# Patient Record
Sex: MALE | Race: WHITE | URBAN - METROPOLITAN AREA
[De-identification: names, ages, dates, MRNs, and addresses within clinical notes are randomized per-mention and may not be internally consistent; named-entity substitution may affect disease eponyms.]

---

## 2017-01-23 ENCOUNTER — OFFICE VISIT (OUTPATIENT)
Dept: OTHER | Facility: OUTPATIENT CENTER | Age: 32
End: 2017-01-23

## 2017-01-23 DIAGNOSIS — F91.8 CONDUCT DISORDER, UNDIFFERENTIATED TYPE: Primary | ICD-10-CM

## 2017-01-23 DIAGNOSIS — F43.23 ADJUSTMENT DISORDER WITH MIXED ANXIETY AND DEPRESSED MOOD: ICD-10-CM

## 2017-01-23 NOTE — MR AVS SNAPSHOT
After Visit Summary   1/23/2017    Issac Henning    MRN: [id number]           Patient Information     Date Of Birth          1985        Visit Information        Provider Department      1/23/2017 2:00 PM Janette Watson, PhD [facility]        Today's Diagnoses     Conduct disorder, undifferentiated type    -  1    Adjustment disorder with mixed anxiety and depressed mood           Follow-ups after your visit        Your next 10 appointments already scheduled     Mar 01, 2017 11:00 AM CST   INDIVIDUAL THERAPY with Janette Watson, PhD   [facility] ([facility])    [address]  Mail [address] 7521  [facility] [address]   [phone]            Mar 23, 2017  9:00 AM CDT   INDIVIDUAL THERAPY with Janette Watson, PhD   [place] ([facility])    [address]  [address]  [facility] [address]   [phone]              Who to contact     Please call your clinic at [phone] to:    Ask questions about your health    Make or cancel appointments    Discuss your medicines    Learn about your test results    Speak to your doctor   If you have compliments or concerns about an experience at your clinic, or if you wish to file a complaint, please contact [facility] Patient Relations at [phone] or email us at [email or web address]         Additional Information About Your Visit        MyChart Information     [employer] is an electronic gateway that provides easy, online access to your medical records. With [employer], you can request a clinic appointment, read your test results, renew a prescription or communicate with your care team.     To sign up for Cruise Comparet visit the website at [email or web address]   You will be asked to enter the access code listed below, as well as some personal information. Please follow the directions to create your username and password.     Your  access code is: [email or web address]  Expires: 2017  2:53 PM     Your access code will  in 90 days. If you need help or a new code, please contact your [facility] or call [phone] for assistance.        Care EveryWhere ID     This is your Care EveryWhere ID. This could be used by other organizations to access your [place] medical records  [id number]         Blood Pressure from Last 3 Encounters:   No data found for BP    Weight from Last 3 Encounters:   No data found for Wt              We Performed the Following     Individual Psychotherapy (53+ min) [85670]        Primary Care Provider    None Specified       No primary provider on file.        Thank you!     Thank you for choosing [facility]  for your care. Our goal is always to provide you with excellent care. Hearing back from our patients is one way we can continue to improve our services. Please take a few minutes to complete the written survey that you may receive in the mail after your visit with us. Thank you!             Your Updated Medication List - Protect others around you: Learn how to safely use, store and throw away your medicines at www.disposemymeds.org.      Notice  As of 2017 11:59 PM    You have not been prescribed any medications.

## 2017-02-02 NOTE — PROGRESS NOTES
"[facility] -  Case Progress Note    Date of Service: 1/23/17  Client Name: Issac Henning  YOB: 1985  MRN:  [id number]  Treating Provider: Janette Watson (Nic\), Ph.D., Postdoctoral Fellow  Type of Session: Individual  Present in Session: Client Only  Number of Minutes: 55  Treatment plan completed: 12/14/2016    Current Symptoms/Status:  Client reported presenting for treatment with concerns regarding compulsive sexual behavior, specifically regarding erotic photographing and using the internet to gain attention and solicit models for photos shoots. Client reported that his wife became aware of his behaviors during their wedding honeymoon, which has led to distress in their relationship.     Client endorsed some depressive and anxious symptoms within the last six months and particularly since his wife caught him. As such, a diagnosis of Adjustment Disorder, With mixed anxiety and depressed mood is warranted at this time.     Progress Toward Treatment Goals:  discussed triggers and associations between thoughts, feelings, and sexually acting out behaviors    Intervention: Modality and Description/Response to Intervention:  Used CBT and interpersonal techniques to assist client in building rapport with this therapist and processing thoughts and feelings associated with compulsive sexual behaviors as well as anxious and depressive symptoms. Continued discussing client s sexually acting out behaviors. Discussed client s life story including what client believed are important antecedents throughout his life leading to his sexual acting out behaviors. Client reported that he felt as if he often struggled with understanding boundaries as his father was  very lenient  and his mother was  very strict.  He provided several examples of how navigating these boundaries were difficult for him. He also reported that he often did not have many friends, which contributed to him seeking attention from others " "due to feeling lonely and isolated. Reviewed the core fuels client began discussing last week including   feeling worthless, feeling undesirable, fear of failure, and fear of being alone. Discussed client making an outline of his life story and how the important life events are related to his core fuels. Client agreed and stated that he thought this was a good exercise.     Assignment:  continue working on antecedents to behavior pattern    Interactive Complexity:  None    Diagnosis:  312.89 (F91.8)  Other Specified Disruptive, Impulse-Control, and Conduct Disorder (Hypersexuality)  309.28 (F43.23) Adjustment Disorder, With mixed anxiety and depressed mood    Plan / Need for Future Services:  Return for therapy twice per month to address concerns related to compulsive sexual behaviors as well as anxious and depressive symptoms.       Janette \"Michael\" Walter, Ph.D.  Postdoctoral Fellow  "

## 2017-02-06 ENCOUNTER — OFFICE VISIT (OUTPATIENT)
Dept: OTHER | Facility: OUTPATIENT CENTER | Age: 32
End: 2017-02-06

## 2017-02-06 DIAGNOSIS — F43.23 ADJUSTMENT DISORDER WITH MIXED ANXIETY AND DEPRESSED MOOD: ICD-10-CM

## 2017-02-06 DIAGNOSIS — F91.8 CONDUCT DISORDER, UNDIFFERENTIATED TYPE: Primary | ICD-10-CM

## 2017-02-06 NOTE — MR AVS SNAPSHOT
After Visit Summary   2/6/2017    Issac Henning    MRN: [id number]           Patient Information     Date Of Birth          1985        Visit Information        Provider Department      2/6/2017 9:00 AM Janette Watson, PhD [facility]        Today's Diagnoses     Conduct disorder, undifferentiated type    -  1    Adjustment disorder with mixed anxiety and depressed mood           Follow-ups after your visit        Your next 10 appointments already scheduled     Mar 01, 2017 11:00 AM CST   INDIVIDUAL THERAPY with Janette Watson, PhD   [facility] ([facility])    [address]  [address]  [facility] [address]   [phone]            Mar 23, 2017  9:00 AM CDT   INDIVIDUAL THERAPY with Janette Watson PhD   [facility] ([facility])    [address]  [address]  [facility] [address]   [phone]              Who to contact     Please call your clinic at [phone] to:    Ask questions about your health    Make or cancel appointments    Discuss your medicines    Learn about your test results    Speak to your doctor   If you have compliments or concerns about an experience at your clinic, or if you wish to file a complaint, please contact [facility] Patient Relations at [phone] or email us at [email or web address]         Additional Information About Your Visit        MyChart Information     Edustation.me is an electronic gateway that provides easy, online access to your medical records. With Ed[employer].me, you can request a clinic appointment, read your test results, renew a prescription or communicate with your care team.     To sign up for Artwardlyt visit the website at [email or web address][email or web address]   You will be asked to enter the access code listed below, as well as some personal information. Please follow the directions to create your username and password.     Your  access code is: [email or web address]  Expires: 2017  2:53 PM     Your access code will  in 90 days. If you need help or a new code, please contact your [facility] or call [phone] for assistance.        Care EveryWhere ID     This is your Care EveryWhere ID. This could be used by other organizations to access your [place] medical records  [id number]         Blood Pressure from Last 3 Encounters:   No data found for BP    Weight from Last 3 Encounters:   No data found for Wt              We Performed the Following     Individual Psychotherapy (38-52 min) [26880]        Primary Care Provider    None Specified       No primary provider on file.        Thank you!     Thank you for choosing [facility]  for your care. Our goal is always to provide you with excellent care. Hearing back from our patients is one way we can continue to improve our services. Please take a few minutes to complete the written survey that you may receive in the mail after your visit with us. Thank you!             Your Updated Medication List - Protect others around you: Learn how to safely use, store and throw away your medicines at www.disposemymeds.org.      Notice  As of 2017 11:59 PM    You have not been prescribed any medications.

## 2017-02-06 NOTE — PROGRESS NOTES
"[facility] -  Case Progress Note    Date of Service: 2/06/17  Client Name: Issac Henning  YOB: 1985  MRN:  [id number]  Treating Provider: Janette \"Michael\" Walter, Ph.D., Postdoctoral Fellow  Type of Session: Individual  Present in Session: Client Only  Number of Minutes: 43  Treatment plan completed: 12/14/2016    Current Symptoms/Status:  Client reported presenting for treatment with concerns regarding compulsive sexual behavior, specifically regarding erotic photographing and using the internet to gain attention and solicit models for photos shoots. Client reported that his wife became aware of his behaviors during their wedding honeymoon, which has led to distress in their relationship.     Client endorsed some depressive and anxious symptoms within the last six months and particularly since his wife caught him. As such, a diagnosis of Adjustment Disorder, With mixed anxiety and depressed mood is warranted at this time.     Progress Toward Treatment Goals:  Continuing to explore triggers and associations between thoughts, feelings, and sexually acting out behaviors    Intervention: Modality and Description/Response to Intervention:  Used CBT, interpersonal, and supportive psychotherapy techniques to assist client with processing thoughts and feelings associated with compulsive sexual behaviors as well as anxious and depressive symptoms. Client arrived to the session late and stated that he misplaced his homework. Discussed client's desire to readdress his wife's concerns that he cheated. Client reported that he is avoiding the conversation and is waiting for his wife to bring it up to him again. Explored how avoidance, minimization, and justifying his behaviors have become themes and contribute to his pattern of behaviors including sexually acting out behaviors. Continued discussing life story. Client described that he felt like an \"outcast\" for much of his childhood. Reported that he would " "have friends for short periods of time. Explained that he would \"cut off\" friendships when his peers asked to come to his house due to \"embarrassment\" that his \"mom was a hoarder.\" Reported that he learned to lie to cover up his shame and embarrassment, which he turned into a coping skill for other areas of his life. Discussion topic shifted at this point and client expressed frustration that he feels his wife is \"controlling\" and \"babying\" him. Explored what client means by these descriptors and also explored alternative conceptualizations of what is occurring in these interactions. Discussed client being \"used to looking out\" for himself and how that frames his interactions with his wife. Towards the end of the session, client stated that he would continue working on his life story and core fuels as homework for the next session.     Assignment:  Continue working on life story and core fuels    Interactive Complexity:  None    Diagnosis:  312.89 (F91.8)  Other Specified Disruptive, Impulse-Control, and Conduct Disorder (Hypersexuality)  309.28 (F43.23) Adjustment Disorder, With mixed anxiety and depressed mood    Plan / Need for Future Services:  Return for therapy twice per month to address concerns related to compulsive sexual behaviors as well as anxious and depressive symptoms.       Janette \"Michael\" Walter, Ph.D.  Postdoctoral Fellow  "

## 2017-02-15 NOTE — PROGRESS NOTES
I did not personally see the patient but I have reviewed and agree with the assessment and plan as documented in this note.  Kamini Dean, PhD -- Supervisor   Licensed Psychologist

## 2017-03-01 ENCOUNTER — OFFICE VISIT (OUTPATIENT)
Dept: OTHER | Facility: OUTPATIENT CENTER | Age: 32
End: 2017-03-01

## 2017-03-01 DIAGNOSIS — F43.23 ADJUSTMENT DISORDER WITH MIXED ANXIETY AND DEPRESSED MOOD: ICD-10-CM

## 2017-03-01 DIAGNOSIS — F91.8 CONDUCT DISORDER, UNDIFFERENTIATED TYPE: Primary | ICD-10-CM

## 2017-03-01 NOTE — MR AVS SNAPSHOT
After Visit Summary   3/1/2017    Issac Henning    MRN: [id number]           Patient Information     Date Of Birth          1987        Visit Information        Provider Department      3/1/2017 10:00 AM Janette Watson, PhD [facility]        Today's Diagnoses     Conduct disorder, undifferentiated type    -  1    Adjustment disorder with mixed anxiety and depressed mood           Follow-ups after your visit        Your next 10 appointments already scheduled     Mar 23, 2017  9:00 AM CDT   INDIVIDUAL THERAPY with Janette Watson, PhD   [facility]    [address]  [address]  [facility] [address]   [phone]              Who to contact     Please call your clinic at [phone] to:    Ask questions about your health    Make or cancel appointments    Discuss your medicines    Learn about your test results    Speak to your doctor   If you have compliments or concerns about an experience at your clinic, or if you wish to file a complaint, please contact [facility] Patient Relations at [phone] or email us at [email or web address]         Additional Information About Your Visit        MyChart Information     [employer] is an electronic gateway that provides easy, online access to your medical records. With [employer], you can request a clinic appointment, read your test results, renew a prescription or communicate with your care team.     To sign up for Mi[employer]t visit the website at [email or web address]   You will be asked to enter the access code listed below, as well as some personal information. Please follow the directions to create your username and password.     Your access code is: [email or web address]  Expires: 2017  2:53 PM     Your access code will  in 90 days. If you need help or a new code, please contact your [facility] or call [phone]  for assistance.        Care EveryWhere ID     This is your Care EveryWhere ID. This could be used by other organizations to access your [place] medical records  [id number]         Blood Pressure from Last 3 Encounters:   No data found for BP    Weight from Last 3 Encounters:   No data found for Wt              We Performed the Following     Individual Psychotherapy (53+ min) [21482]        Primary Care Provider    None Specified       No primary provider on file.        Thank you!     Thank you for choosing [facility]  for your care. Our goal is always to provide you with excellent care. Hearing back from our patients is one way we can continue to improve our services. Please take a few minutes to complete the written survey that you may receive in the mail after your visit with us. Thank you!             Your Updated Medication List - Protect others around you: Learn how to safely use, store and throw away your medicines at www.disposemymeds.org.      Notice  As of 3/1/2017 11:59 PM    You have not been prescribed any medications.

## 2017-03-01 NOTE — PROGRESS NOTES
"[facility] -  Case Progress Note    Date of Service: 3/01/17  Client Name: Issac Henning  YOB: 1985  MRN:  [id number]  Treating Provider: Janette Watson (Nic\), Ph.D., Postdoctoral Fellow  Type of Session: Individual  Present in Session: Client Only  Number of Minutes: 53  Treatment plan completed: 12/14/2016    Current Symptoms/Status:  Client reported presenting for treatment with concerns regarding compulsive sexual behavior, specifically regarding erotic photographing and using the internet to gain attention and solicit models for photos shoots. Client reported that his wife became aware of his behaviors during their wedding honeymoon, which has led to distress in their relationship.     Client endorsed some depressive and anxious symptoms within the last six months and particularly since his wife caught him. As such, a diagnosis of Adjustment Disorder, With mixed anxiety and depressed mood is warranted at this time.     Progress Toward Treatment Goals:  Continuing to explore triggers and associations between thoughts, feelings, and sexually acting out behaviors; began discussing thought logs    Intervention: Modality and Description/Response to Intervention:  Used CBT, interpersonal, and supportive psychotherapy techniques to assist client with processing thoughts and feelings associated with compulsive sexual behaviors as well as anxious and depressive symptoms. Client began the session reporting that he had completed his treatment work. Discussed client's homework, specifically client's life story and how particular life events relate to his core fuels. Praised client for completing his work and explaining it to this therapist. Explored how client's core fuels get triggered currently. Discussed examples from work, his marriage, and incidents with his parents. Provided psychoeducation about the reciprocal interaction of thoughts, feelings, and behaviors as well as thought logs. Used " "examples he provided to complete practice thought logs in session. Client reported being \"shocked\" to see similar words he used for core fuels come up with daily life examples. Discussed examples related to his compulsive sexual behaviors. Discussed client's core fuels being triggered results in \"feel good\" activities such as when he has looked up models on the internet and scheduled models for photoshoots. Discussed the importance of disputing his thoughts/creating alternative ways of thinking when he encounters triggering situations that result in urges to engage in problematic behaviors. Discussed how thoughts logs can assist with this. Client was engaged throughout the session and open to feedback.    Assignment:  Complete thought logs (3 per week)    Interactive Complexity:  None    Diagnosis:  312.89 (F91.8)  Other Specified Disruptive, Impulse-Control, and Conduct Disorder (Hypersexuality)  309.28 (F43.23) Adjustment Disorder, With mixed anxiety and depressed mood    Plan / Need for Future Services:  Return for therapy twice per month to address concerns related to compulsive sexual behaviors as well as anxious and depressive symptoms.       Janette \"Michael\" Walter, Ph.D.  Postdoctoral Fellow  "

## 2017-03-06 NOTE — PROGRESS NOTES
I did not personally see the patient.  I reviewed and agree with the assessment and plan as documented in this note. Johanne Ansari, PhD, LP

## 2017-03-23 ENCOUNTER — OFFICE VISIT (OUTPATIENT)
Dept: OTHER | Facility: OUTPATIENT CENTER | Age: 32
End: 2017-03-23

## 2017-03-23 DIAGNOSIS — F91.8 CONDUCT DISORDER, UNDIFFERENTIATED TYPE: Primary | ICD-10-CM

## 2017-03-23 DIAGNOSIS — F43.23 ADJUSTMENT DISORDER WITH MIXED ANXIETY AND DEPRESSED MOOD: ICD-10-CM

## 2017-03-23 NOTE — MR AVS SNAPSHOT
After Visit Summary   3/23/2017    Issac Henning    MRN: [id number]           Patient Information     Date Of Birth          1985        Visit Information        Provider Department      3/23/2017 9:00 AM Janette Watson, PhD [facility]        Today's Diagnoses     Conduct disorder, undifferentiated type    -  1    Adjustment disorder with mixed anxiety and depressed mood           Follow-ups after your visit        Your next 10 appointments already scheduled     Apr 26, 2017  4:00 PM CDT   INDIVIDUAL THERAPY with Janette Watson PhD   [facility] ([facility])    [address]  [address]  [facility] [address]   [phone]            May 24, 2017  9:00 AM CDT   INDIVIDUAL THERAPY with Janette Watson PhD   [facility] ([facility])    [address]  [address]  [facility] [address]   [phone]            Jun 14, 2017 11:00 AM CDT   INDIVIDUAL THERAPY with Janette Watson PhD   [facility] ([facility])    [address]  [address]  [facility] [address]   [phone]              Who to contact     Please call your clinic at [phone] to:    Ask questions about your health    Make or cancel appointments    Discuss your medicines    Learn about your test results    Speak to your doctor   If you have compliments or concerns about an experience at your clinic, or if you wish to file a complaint, please contact [facility] Patient Relations at [phone] or email us at [email or web address]         Additional Information About Your Visit        MyChart Information     Mirriad is an electronic gateway that provides easy, online access to your medical records. With Mirriad, you can request a clinic appointment, read your test results, renew a prescription or communicate with your care team.     To sign up for MyChart visit  the website at [email or web address]   You will be asked to enter the access code listed below, as well as some personal information. Please follow the directions to create your username and password.     Your access code is: [email or web address]  Expires: 2017  3:53 PM     Your access code will  in 90 days. If you need help or a new code, please contact your [facility] or call [phone] for assistance.        Care EveryWhere ID     This is your Care EveryWhere ID. This could be used by other organizations to access your [place] medical records  [id number]         Blood Pressure from Last 3 Encounters:   No data found for BP    Weight from Last 3 Encounters:   No data found for Wt              We Performed the Following     Individual Psychotherapy (38-52 min) [62910]        Primary Care Provider    None Specified       No primary provider on file.        Thank you!     Thank you for choosing [facility]  for your care. Our goal is always to provide you with excellent care. Hearing back from our patients is one way we can continue to improve our services. Please take a few minutes to complete the written survey that you may receive in the mail after your visit with us. Thank you!             Your Updated Medication List - Protect others around you: Learn how to safely use, store and throw away your medicines at www.disposemymeds.org.      Notice  As of 3/23/2017 11:59 PM    You have not been prescribed any medications.

## 2017-04-10 NOTE — PROGRESS NOTES
"[facility] -  Case Progress Note    Date of Service: 3/23/17  Client Name: Issac Henning  YOB: 1985  MRN:  [id number]  Treating Provider: Janette Watson (Nic\), Ph.D., Postdoctoral Fellow  Type of Session: Individual  Present in Session: Client Only  Number of Minutes: 45  Treatment plan completed: 12/14/2016    Current Symptoms/Status:  Client reported presenting for treatment with concerns regarding compulsive sexual behavior, specifically regarding erotic photographing and using the internet to gain attention and solicit models for photos shoots. Client reported that his wife became aware of his behaviors during their wedding honeymoon, which has led to distress in their relationship.     Client endorsed some depressive and anxious symptoms within the last six months and particularly since his wife caught him. As such, a diagnosis of Adjustment Disorder, With mixed anxiety and depressed mood is warranted at this time.     Progress Toward Treatment Goals:  Arrived 15 minutes late to session; continuing to explore triggers and associations between thoughts, feelings, and sexually acting out behaviors; began discussing thought logs    Intervention: Modality and Description/Response to Intervention:  Cognitive behavioral and supportive psychotherapy techniques were used to assist client with processing his urges and boundaries. Client reported having a recent boundary violation and how this resulted in an argument with his wife. Client shared his self-narrative when he had the urge to violate his boundary and his self-narrative afterwards. Processed his thinking about boundaries and his difficulty maintaining them. Client shared that he feels  controlled  and explored his thoughts and core beliefs related to this. Discussed the importance of understanding his wife s feelings in this situation and how his behaviors impact both him and her. Towards end of session, discussed and agreed to " "include client s wife in an upcoming session. Client was engaged throughout the session and open to feedback.    Assignment:  Complete thought logs    Interactive Complexity:  None    Diagnosis:  312.89 (F91.8)  Other Specified Disruptive, Impulse-Control, and Conduct Disorder (Hypersexuality)  309.28 (F43.23) Adjustment Disorder, With mixed anxiety and depressed mood    Plan / Need for Future Services:  Return for therapy twice per month to address concerns related to compulsive sexual behaviors as well as anxious and depressive symptoms.       Janette \"Michael\" Walter, Ph.D.  Postdoctoral Fellow  "

## 2017-04-17 NOTE — PROGRESS NOTES
I did not personally see the patient.  I reviewed and agree with the assessment and plan as documented in this note. Johanne Asnari, PhD, LP

## 2017-04-26 ENCOUNTER — OFFICE VISIT (OUTPATIENT)
Dept: OTHER | Facility: OUTPATIENT CENTER | Age: 32
End: 2017-04-26

## 2017-04-26 DIAGNOSIS — F91.8 CONDUCT DISORDER, UNDIFFERENTIATED TYPE: Primary | ICD-10-CM

## 2017-04-26 DIAGNOSIS — F43.23 ADJUSTMENT DISORDER WITH MIXED ANXIETY AND DEPRESSED MOOD: ICD-10-CM

## 2017-04-26 NOTE — MR AVS SNAPSHOT
After Visit Summary   4/26/2017    Issac Henning    MRN: [id number]           Patient Information     Date Of Birth          1985        Visit Information        Provider Department      4/26/2017 4:00 PM Janette Watson, PhD [facility]        Today's Diagnoses     Conduct disorder, undifferentiated type    -  1    Adjustment disorder with mixed anxiety and depressed mood           Follow-ups after your visit        Your next 10 appointments already scheduled     May 24, 2017  9:00 AM CDT   INDIVIDUAL THERAPY with Janette Watson, PhD   [facility] ([facility])    [address]  [address]  [facility] [address]   [phone]            Jun 14, 2017 11:00 AM CDT   INDIVIDUAL THERAPY with Janette Watson, PhD   [facility] ([facility])    [address]  [address]  [facility] [address]   [phone]              Who to contact     Please call your clinic at [phone] to:    Ask questions about your health    Make or cancel appointments    Discuss your medicines    Learn about your test results    Speak to your doctor   If you have compliments or concerns about an experience at your clinic, or if you wish to file a complaint, please contact [facility] Patient Relations at [phone] or email us at [email or web address]         Additional Information About Your Visit        MyChart Information     Alkymos is an electronic gateway that provides easy, online access to your medical records. With Alkymos, you can request a clinic appointment, read your test results, renew a prescription or communicate with your care team.     To sign up for [employer] visit the website at [email or web address]   You will be asked to enter the access code listed below, as well as some personal information. Please follow the directions to create your username and password.     Your  access code is: [email or web address]  Expires: 2017  9:08 PM     Your access code will  in 90 days. If you need help or a new code, please contact your [facility] or call [phone] for assistance.        Care EveryWhere ID     This is your Care EveryWhere ID. This could be used by other organizations to access your [place] medical records  [id number]         Blood Pressure from Last 3 Encounters:   No data found for BP    Weight from Last 3 Encounters:   No data found for Wt              We Performed the Following     Individual Psychotherapy (53+ min) [47668]        Primary Care Provider    None Specified       No primary provider on file.        Thank you!     Thank you for choosing [facility]  for your care. Our goal is always to provide you with excellent care. Hearing back from our patients is one way we can continue to improve our services. Please take a few minutes to complete the written survey that you may receive in the mail after your visit with us. Thank you!             Your Updated Medication List - Protect others around you: Learn how to safely use, store and throw away your medicines at www.disposemymeds.org.      Notice  As of 2017 11:59 PM    You have not been prescribed any medications.

## 2017-05-01 NOTE — PROGRESS NOTES
"[facility] -  Case Progress Note    Date of Service: 4/26/17  Client Name: Issac Henning  YOB: 1985  MRN:  [id number]  Treating Provider: Janette Watson (Nic\), Ph.D., Postdoctoral Fellow  Type of Session: Individual  Present in Session: Client Only  Number of Minutes: 55  Treatment plan completed: 12/14/2016    Current Symptoms/Status:  Client reported presenting for treatment with concerns regarding compulsive sexual behavior, specifically regarding erotic photographing and using the internet to gain attention and solicit models for photos shoots. Client reported that his wife became aware of his behaviors during their wedding honeymoon, which has led to distress in their relationship.     Client endorsed some depressive and anxious symptoms within the last six months and particularly since his wife caught him. As such, a diagnosis of Adjustment Disorder, With mixed anxiety and depressed mood is warranted at this time.     Progress Toward Treatment Goals:  Continuing to explore triggers and associations between thoughts, feelings, and sexually acting out behaviors; continuing to complete CBT thought logs    Intervention: Modality and Description/Response to Intervention:  Cognitive behavioral and supportive psychotherapy techniques were used to assist client with processing his urges and ongoing distress. Processed recent stress, anxiety, and frustrations in relationship. Use CBT form to process recent incident where he got in an argument with his wife and engaged in a negative self-narrative. He shared details of the interaction and what he did. Discussed patterns that seemed to be present. Explored connections of patterns to childhood experiences, attention seeking, and acting out behaviors. Processed ways to communicate some of his patterns of his partner and steps to change some patterns. Towards the end of the session, made plans for client's wife to come to next session to meet " "therapist and address some questions she has.     Assignment:  Complete thought logs    Interactive Complexity:  None    Diagnosis:  312.89 (F91.8)  Other Specified Disruptive, Impulse-Control, and Conduct Disorder (Hypersexuality)  309.28 (F43.23) Adjustment Disorder, With mixed anxiety and depressed mood    Plan / Need for Future Services:  Return for therapy twice per month to address concerns related to compulsive sexual behaviors as well as anxious and depressive symptoms.       Janette \"Michael\" Walter, Ph.D.  Postdoctoral Fellow  "

## 2017-05-24 ENCOUNTER — OFFICE VISIT (OUTPATIENT)
Dept: OTHER | Facility: OUTPATIENT CENTER | Age: 32
End: 2017-05-24

## 2017-05-24 DIAGNOSIS — F43.23 ADJUSTMENT DISORDER WITH MIXED ANXIETY AND DEPRESSED MOOD: ICD-10-CM

## 2017-05-24 DIAGNOSIS — F91.8 CONDUCT DISORDER, UNDIFFERENTIATED TYPE: Primary | ICD-10-CM

## 2017-05-24 NOTE — MR AVS SNAPSHOT
After Visit Summary   5/24/2017    Issac Henning    MRN: [id number]           Patient Information     Date Of Birth          1985        Visit Information        Provider Department      5/24/2017 9:00 AM Janette Watson, PhD [facility]        Today's Diagnoses     Conduct disorder, undifferentiated type    -  1    Adjustment disorder with mixed anxiety and depressed mood           Follow-ups after your visit        Your next 10 appointments already scheduled     Jun 14, 2017 11:00 AM CDT   INDIVIDUAL THERAPY with Janette Watson PhD   [facility] ([facility])    [address]  [address]  [facility] [address]   [phone]            Jun 28, 2017  3:00 PM CDT   INDIVIDUAL THERAPY with Janette Watson PhD   [facility] ([facility])    [address]  [address]  [facility] [address]   [phone]            Jul 12, 2017 10:00 AM CDT   INDIVIDUAL THERAPY with Janette Watson PhD   [facility] ([facility])    [address]  [address]  [facility] [address]   [phone]            Jul 31, 2017  9:00 AM CDT   INDIVIDUAL THERAPY with Janette Watson PhD   [facility] ([facility])    [address]  [address]  [facility] [address]   [phone]              Who to contact     Please call your clinic at [phone] to:    Ask questions about your health    Make or cancel appointments    Discuss your medicines    Learn about your test results    Speak to your doctor   If you have compliments or concerns about an experience at your clinic, or if you wish to file a complaint, please contact [facility] Patient Relations at [phone] or email us at [email or web address]         Additional Information About Your Visit        MyChart Information     WANdisco is an electronic gateway that  provides easy, online access to your medical records. With Introhive, you can request a clinic appointment, read your test results, renew a prescription or communicate with your care team.     To sign up for Introhive visit the website at [email or web address]   You will be asked to enter the access code listed below, as well as some personal information. Please follow the directions to create your username and password.     Your access code is: [email or web address]  Expires: 2017  9:08 PM     Your access code will  in 90 days. If you need help or a new code, please contact your [facility] or call [phone] for assistance.        Care EveryWhere ID     This is your Care EveryWhere ID. This could be used by other organizations to access your [place] medical records  [id number]         Blood Pressure from Last 3 Encounters:   No data found for BP    Weight from Last 3 Encounters:   No data found for Wt              We Performed the Following     Individual Psychotherapy (53+ min) [70550]        Primary Care Provider    None Specified       No primary provider on file.        Thank you!     Thank you for choosing [facility]  for your care. Our goal is always to provide you with excellent care. Hearing back from our patients is one way we can continue to improve our services. Please take a few minutes to complete the written survey that you may receive in the mail after your visit with us. Thank you!             Your Updated Medication List - Protect others around you: Learn how to safely use, store and throw away your medicines at www.disposemymeds.org.      Notice  As of 2017 11:59 PM    You have not been prescribed any medications.

## 2017-05-24 NOTE — PROGRESS NOTES
"[facility] -  Case Progress Note    Date of Service: 5/24/17  Client Name: Issac Henning  YOB: 1985  MRN:  [id number]  Treating Provider: Janette Downs\" Walter, Ph.D., Postdoctoral Fellow  Type of Session: Individual  Present in Session: Client Only  Number of Minutes: 55  Treatment plan completed: 12/14/2016    Current Symptoms/Status:  Client reported presenting for treatment with concerns regarding compulsive sexual behavior, specifically regarding erotic photographing and using the internet to gain attention and solicit models for photos shoots. Client reported that his wife became aware of his behaviors during their wedding honeymoon, which has led to distress in their relationship.     Client endorsed some depressive and anxious symptoms within the last six months and particularly since his wife caught him. As such, a diagnosis of Adjustment Disorder, With mixed anxiety and depressed mood is warranted at this time.     Progress Toward Treatment Goals:  Continuing to explore triggers and associations between thoughts, feelings, and sexually acting out behaviors; continuing to complete CBT thought logs    Intervention: Modality and Description/Response to Intervention:  Cognitive behavioral and supportive psychotherapy techniques were used to assist client with processing his urges and ongoing distress. Began the session discussing frequency of client's appointments and arranging appointments so client is coming in twice per month. Client reported that his wife was not able to attend the session because she had to go out of town for work. Client noted fluctuations in mood. Noted financial stress due to receiving bills for sessions and not being sure why insurance has not been paying for sessions. Encouraged client to call insurance company to discuss concerns and determine if it is possible that he is paying his deductible. Client agreed to do so. Then, processed client's recent anxiety " "and frustrations in relationship. Client shared that he has urges to violate boundaries while online. Used CBT form to process recent stress at home and arguments with wife. Discussed patterns that seems to be present. Explored how client tends to escape stress with video games and both nonsexual and sexual websites. Discussed wanting minimal interaction when stressed and that his virtual world is pleasurable requiring minimal verbal or physical interaction. Discussed the importance of sharing how he is feeling with his wife and communicating his coping needs with her, which includes listening to her perspective and what she needs to cope with her own stress.      Assignment:  Complete thought logs    Interactive Complexity:  None    Diagnosis:  312.89 (F91.8)  Other Specified Disruptive, Impulse-Control, and Conduct Disorder (Hypersexuality)  309.28 (F43.23) Adjustment Disorder, With mixed anxiety and depressed mood    Plan / Need for Future Services:  Return for therapy twice per month to address concerns related to compulsive sexual behaviors as well as anxious and depressive symptoms.       Janette \"Michael\" Walter, Ph.D.  Postdoctoral Fellow  "

## 2017-05-24 NOTE — Clinical Note
Jerry Whiting,  Please use this version of this progress note. I updated it because I forgot to add something. Thanks, Michael

## 2017-06-14 ENCOUNTER — OFFICE VISIT (OUTPATIENT)
Dept: OTHER | Facility: OUTPATIENT CENTER | Age: 32
End: 2017-06-14

## 2017-06-14 DIAGNOSIS — F43.23 ADJUSTMENT DISORDER WITH MIXED ANXIETY AND DEPRESSED MOOD: ICD-10-CM

## 2017-06-14 DIAGNOSIS — F91.8 CONDUCT DISORDER, UNDIFFERENTIATED TYPE: Primary | ICD-10-CM

## 2017-06-14 NOTE — PROGRESS NOTES
"[facility] -  Case Progress Note    Date of Service: 6/14/17  Client Name: Issac Henning  YOB: 1985  MRN:  [id number]  Treating Provider: Janette \"Michael\" Walter, Ph.D., Postdoctoral Fellow  Type of Session: Individual  Present in Session: Client Only  Number of Minutes: 55  Treatment plan completed: 12/14/2016    Current Symptoms/Status:  Client reported presenting for treatment with concerns regarding compulsive sexual behavior, specifically regarding erotic photographing and using the internet to gain attention and solicit models for photos shoots. Client reported that his wife became aware of his behaviors during their wedding honeymoon, which has led to distress in their relationship.     Client endorsed some depressive and anxious symptoms within the last six months and particularly since his wife caught him. As such, a diagnosis of Adjustment Disorder, With mixed anxiety and depressed mood is warranted at this time.     Progress Toward Treatment Goals:  Making progress with talking about patterns and urges; noted concerns about arguments with wife    Intervention: Modality and Description/Response to Intervention:  Cognitive behavioral, interpersonal, and supportive psychotherapy techniques were used to assist client with processing his urges and ongoing distress. Client shared feeling depressed and anxious. Processed client's thoughts and feelings regarding recent argument with wife. Client shared that his wife makes \"mean and abusive comments\" and expressed concerns about the amount that she drinks. Processed how this is impacting client. Client shared that he had a boundary violation after recent argument. Discussed pattern and ways to change. Client was engaged in the discussion and discussed alternative activities he could do instead of acting out. Shared that he would like to talk about his boundary violation in couple's therapy. Reinforced client seeking support with these " "difficult conversation. Processed what client would like his wife to hear from him. Client also shared that his wife has expressed concerns that he does not spend enough time with her due to working. Processed client using work as an avoidance strategy. Discussed ways client can show that he is listening and valuing what wife has communicated. Also, discussed ways client can expressed his needs and concerns.     Assignment:  Work on boundaries  Complete thought logs    Interactive Complexity:  None    Diagnosis:  312.89 (F91.8)  Other Specified Disruptive, Impulse-Control, and Conduct Disorder (Hypersexuality)  309.28 (F43.23) Adjustment Disorder, With mixed anxiety and depressed mood    Plan / Need for Future Services:  Return for therapy twice per month to address concerns related to compulsive sexual behaviors as well as anxious and depressive symptoms.       Janette \"Michael\" Walter, Ph.D.  Postdoctoral Fellow  "

## 2017-06-14 NOTE — MR AVS SNAPSHOT
After Visit Summary   6/14/2017    Issac Henning    MRN: [id number]           Patient Information     Date Of Birth          1985        Visit Information        Provider Department      6/14/2017 11:00 AM Janette Watson, PhD [facility]        Today's Diagnoses     Conduct disorder, undifferentiated type    -  1    Adjustment disorder with mixed anxiety and depressed mood           Follow-ups after your visit        Your next 10 appointments already scheduled     Jun 28, 2017  3:00 PM CDT   INDIVIDUAL THERAPY with Janette Watson PhD   [facility] ([facility])    [address]  [address]  [facility] [address]   [phone]            Jul 12, 2017 10:00 AM CDT   INDIVIDUAL THERAPY with Janette Watson PhD   [facility] ([facility])    [address]  [address]  [facility] [address]   [phone]            Jul 31, 2017  9:00 AM CDT   INDIVIDUAL THERAPY with Janette Watson PhD   [facility] ([facility])    [address]  [address]  [facility] [address]   [phone]              Who to contact     Please call your clinic at [phone] to:    Ask questions about your health    Make or cancel appointments    Discuss your medicines    Learn about your test results    Speak to your doctor   If you have compliments or concerns about an experience at your clinic, or if you wish to file a complaint, please contact [facility] Patient Relations at [phone] or email us at [email or web address]         Additional Information About Your Visit        MyChart Information     Reppify is an electronic gateway that provides easy, online access to your medical records. With Reppify, you can request a clinic appointment, read your test results, renew a prescription or communicate with your care team.     To sign up for Reppify  visit the website at [email or web address]   You will be asked to enter the access code listed below, as well as some personal information. Please follow the directions to create your username and password.     Your access code is: [email or web address]  Expires: 2017  9:08 PM     Your access code will  in 90 days. If you need help or a new code, please contact your [facility] or call [phone] for assistance.        Care EveryWhere ID     This is your Care EveryWhere ID. This could be used by other organizations to access your [place] medical records  [id number]         Blood Pressure from Last 3 Encounters:   No data found for BP    Weight from Last 3 Encounters:   No data found for Wt              We Performed the Following     Individual Psychotherapy (53+ min) [72306]        Primary Care Provider    None Specified       No primary provider on file.        Equal Access to Services     [facility]: Hadii bethany Ruby, jakob phillips, lexi paniagua, malou granados . So [facility] [phone].    ATENCIÓN: Si habla español, tiene a gusman disposición servicios gratuitos de asistencia lingüística. Llame al [phone].    We comply with applicable federal civil rights laws and [place] laws. We do not discriminate on the basis of race, color, national origin, age, disability sex, sexual orientation or gender identity.            Thank you!     Thank you for choosing [place] [facility]  for your care. Our goal is always to provide you with excellent care. Hearing back from our patients is one way we can continue to improve our services. Please take a few minutes to complete the written survey that you may receive in the mail after your visit with us. Thank you!             Your Updated Medication List - Protect others around you: Learn how to safely use, store and throw away your medicines at www.disposemymeds.org.       Notice  As of 6/14/2017 11:59 PM    You have not been prescribed any medications.

## 2017-07-12 ENCOUNTER — OFFICE VISIT (OUTPATIENT)
Dept: OTHER | Facility: OUTPATIENT CENTER | Age: 32
End: 2017-07-12

## 2017-07-12 DIAGNOSIS — F91.8 CONDUCT DISORDER, UNDIFFERENTIATED TYPE: Primary | ICD-10-CM

## 2017-07-12 DIAGNOSIS — F43.23 ADJUSTMENT DISORDER WITH MIXED ANXIETY AND DEPRESSED MOOD: ICD-10-CM

## 2017-07-12 NOTE — MR AVS SNAPSHOT
After Visit Summary   2017    Issac Henning    MRN: [id number]           Patient Information     Date Of Birth          1985        Visit Information        Provider Department      2017 10:00 AM Janette Watson, PhD [facility]        Today's Diagnoses     Conduct disorder, undifferentiated type    -  1    Adjustment disorder with mixed anxiety and depressed mood           Follow-ups after your visit        Your next 10 appointments already scheduled     2017  9:00 AM CDT   INDIVIDUAL THERAPY with Janette Watson, PhD   [facility] ([facility])    [address]  Mail Code 7521  [facility] [address]   [phone]              Who to contact     Please call your clinic at [phone] to:    Ask questions about your health    Make or cancel appointments    Discuss your medicines    Learn about your test results    Speak to your doctor   If you have compliments or concerns about an experience at your clinic, or if you wish to file a complaint, please contact [facility] Patient Relations at [phone] or email us at [email or web address]         Additional Information About Your Visit        MyChart Information     Movable is an electronic gateway that provides easy, online access to your medical records. With Movable, you can request a clinic appointment, read your test results, renew a prescription or communicate with your care team.     To sign up for Hypersoft Information Systemst visit the website at [email or web address]   You will be asked to enter the access code listed below, as well as some personal information. Please follow the directions to create your username and password.     Your access code is: [email or web address]  Expires: 2017  9:08 PM     Your access code will  in 90 days. If you need help or a new code, please contact your [facility] or call  [phone] for assistance.        Care EveryWhere ID     This is your Care EveryWhere ID. This could be used by other organizations to access your [facility] medical records  [id number]         Blood Pressure from Last 3 Encounters:   No data found for BP    Weight from Last 3 Encounters:   No data found for Wt              We Performed the Following     Individual Psychotherapy (38-52 min) [02715]        Primary Care Provider    None Specified       No primary provider on file.        Equal Access to Services     RIANNA [facility] : Hadii bethany casarez hadcharleyo Sonathalie, waaxda luqadaha, qaybta kaalmada adealmitayada, waxay idiin hayrayshawndomenic larajeisonjackie granados . So [facility] [phone].    ATENCIÓN: Si habla español, tiene a gusman disposición servicios gratuitos de asistencia lingüística. Llame al [phone].    We comply with applicable federal civil rights laws and [place] laws. We do not discriminate on the basis of race, color, national origin, age, disability sex, sexual orientation or gender identity.            Thank you!     Thank you for choosing [facility]  for your care. Our goal is always to provide you with excellent care. Hearing back from our patients is one way we can continue to improve our services. Please take a few minutes to complete the written survey that you may receive in the mail after your visit with us. Thank you!             Your Updated Medication List - Protect others around you: Learn how to safely use, store and throw away your medicines at www.disposemymeds.org.      Notice  As of 7/12/2017 11:59 PM    You have not been prescribed any medications.

## 2017-07-12 NOTE — PROGRESS NOTES
"[facility] -  Case Progress Note    Date of Service: 7/12/17  Client Name: Issac Henning  YOB: 1985  MRN:  [id number]  Treating Provider: Janette Macdonald" Walter, Ph.D., Postdoctoral Fellow  Type of Session: Individual  Present in Session: Client Only  Number of Minutes: 45  Treatment plan completed:   Health Maintenance Summary - Mental Health Treatment Plan       Status Date      Mental Health Tx Plan Q11 MOS Next Due 11/14/2017      Done 12/14/2016         Current Symptoms/Status:  Client reported presenting for treatment with concerns regarding compulsive sexual behavior, specifically regarding erotic photographing and using the internet to gain attention and solicit models for photos shoots. Client reported that his wife became aware of his behaviors during their wedding honeymoon, which has led to distress in their relationship.     Client endorsed some depressive and anxious symptoms within the last six months and particularly since his wife caught him. As such, a diagnosis of Adjustment Disorder, With mixed anxiety and depressed mood is warranted at this time.     Progress Toward Treatment Goals:  Making progress with talking about patterns and urges; noted concerns about arguments with wife    Intervention: Modality and Description/Response to Intervention:  Cognitive behavioral, interpersonal, and supportive psychotherapy techniques were used to assist client with processing his urges and ongoing distress. Client shared feeling depressed and anxious. Processed client's frustration and confusion related to arguments with wife, particularly when his wife says mean comments and the next day says she didn't mean the comments. Client shared that he has the urge to react similarly back to her but tries not to. Client noted that he has a tendency to seek revenge by both having the urge to react similarly back to wife as well as violate boundaries. Explored and identified behavior pattern, " "how it shows up in subtle and overt ways, and the purpose it serves. Discussed client \"not being present\" in his relationship when feeling this way and explored ideas for changing this. Towards the end of the session, checked in about how couple's therapy is going. Client shared concerns about not feeling he can be fully honest. Discussed the importance of being honest in therapy. Encouraged him to talk about this with his couple's therapist.     Assignment:  Work on boundaries  Complete thought logs    Interactive Complexity:  None    Diagnosis:  312.89 (F91.8)  Other Specified Disruptive, Impulse-Control, and Conduct Disorder (Hypersexuality)  309.28 (F43.23) Adjustment Disorder, With mixed anxiety and depressed mood    Plan / Need for Future Services:  Return for therapy twice per month to address concerns related to compulsive sexual behaviors as well as anxious and depressive symptoms.       Janette \"Michael\" Walter, Ph.D.  Postdoctoral Fellow  "

## 2017-07-31 ENCOUNTER — OFFICE VISIT (OUTPATIENT)
Dept: OTHER | Facility: OUTPATIENT CENTER | Age: 32
End: 2017-07-31

## 2017-07-31 DIAGNOSIS — F43.23 ADJUSTMENT DISORDER WITH MIXED ANXIETY AND DEPRESSED MOOD: ICD-10-CM

## 2017-07-31 DIAGNOSIS — F91.8 CONDUCT DISORDER, UNDIFFERENTIATED TYPE: Primary | ICD-10-CM

## 2017-07-31 NOTE — MR AVS SNAPSHOT
After Visit Summary   2017    Issac Henning    MRN: [id number]           Patient Information     Date Of Birth          1985        Visit Information        Provider Department      2017 9:00 AM Janette Watson, PhD [facility]        Today's Diagnoses     Conduct disorder, undifferentiated type    -  1    Adjustment disorder with mixed anxiety and depressed mood           Follow-ups after your visit        Who to contact     Please call your clinic at [phone] to:    Ask questions about your health    Make or cancel appointments    Discuss your medicines    Learn about your test results    Speak to your doctor   If you have compliments or concerns about an experience at your clinic, or if you wish to file a complaint, please contact [facility] Patient Relations at [phone] or email us at [email or web address]         Additional Information About Your Visit        MyChart Information     ThreatStreamt is an electronic gateway that provides easy, online access to your medical records. With HardDrones, you can request a clinic appointment, read your test results, renew a prescription or communicate with your care team.     To sign up for ThreatStreamt visit the website at [email or web address]   You will be asked to enter the access code listed below, as well as some personal information. Please follow the directions to create your username and password.     Your access code is: [email or web address]  Expires: 2017  9:08 PM     Your access code will  in 90 days. If you need help or a new code, please contact your [facility] or call [phone] for assistance.        Care EveryWhere ID     This is your Care EveryWhere ID. This could be used by other organizations to access your [facility] medical records  [id number]         Blood Pressure from Last 3 Encounters:   No data found for BP    Weight from Last 3  Encounters:   No data found for Wt              We Performed the Following     Individual Psychotherapy (38-52 min) [10396]        Primary Care Provider    None Specified       No primary provider on file.        Equal Access to Services     RIANNA KENDALL : Alanna Ruby, jakob phillips, miashalily guerracorriemike dominguezelizabethmike, waxgerald sajanin hayaadomenic dominguezalmita mills roberta rivera. So [facility] [phone].    ATENCIÓN: Si habla español, tiene a gusman disposición servicios gratuitos de asistencia lingüística. Llame al [phone].    We comply with applicable federal civil rights laws and [place] laws. We do not discriminate on the basis of race, color, national origin, age, disability sex, sexual orientation or gender identity.            Thank you!     Thank you for choosing [facility]  for your care. Our goal is always to provide you with excellent care. Hearing back from our patients is one way we can continue to improve our services. Please take a few minutes to complete the written survey that you may receive in the mail after your visit with us. Thank you!             Your Updated Medication List - Protect others around you: Learn how to safely use, store and throw away your medicines at www.disposemymeds.org.      Notice  As of 7/31/2017 11:59 PM    You have not been prescribed any medications.

## 2017-07-31 NOTE — PROGRESS NOTES
"[facility] -  Case Progress Note    Date of Service: 7/31/17  Client Name: Issac Henning  YOB: 1985  MRN:  [id number]  Treating Provider: Janette Watson (Nic\), Ph.D., Postdoctoral Fellow  Type of Session: Individual  Present in Session: Client Only  Number of Minutes: 45  Treatment plan completed:   Health Maintenance Summary - Mental Health Treatment Plan       Status Date      Mental Health Tx Plan Q11 MOS Next Due 11/14/2017      Done 12/14/2016         Current Symptoms/Status:  Client reported presenting for treatment with concerns regarding compulsive sexual behavior, specifically regarding erotic photographing and using the internet to gain attention and solicit models for photoshoots. Client reported that his wife became aware of his behaviors during their wedding honeymoon, which has led to distress in their relationship.     Client endorsed some depressive and anxious symptoms within the last six months and particularly since his wife caught him. As such, a diagnosis of Adjustment Disorder, With mixed anxiety and depressed mood is warranted at this time.     Progress Toward Treatment Goals:  Making progress with talking about patterns and urges; noted concerns about arguments with wife    Intervention: Modality and Description/Response to Intervention:  Cognitive behavioral, interpersonal, and supportive psychotherapy techniques were used to assist client with processing his urges and ongoing distress. Client shared about continuing to feel depressed and anxious, particularly regarding conflicts with wife. Processed client's frustrations with wife and how he communications his frustrations to her. Client shared that his wife has been talking about wanting to trying to have a baby in the fall. Discussed client's thoughts and feelings about this. Explored pros and cons and whether he feels ready to have children. Client then shared about feeling overwhelmed and chaotic which results " "in him having urges to act out. Noted that his urges stem from a place of \"rebellion\" and then he wants to act out. Discussed how this plays into his cycle and ways to interrupt this pattern.     Towards the end of the session, revisited conversation from last session about the importance of being honest in couple's therapy. Client shared concerns that session was cancelled because his wife forgot and made other plans. Discussed communicating the importance of prioritizing treatment and being consistent. Client stated that he is moving closer to wanting to talk with couple's therapist about this. Encouraged client to do so.    Assignment:  Work on boundaries  Complete thought logs    Interactive Complexity:  None    Diagnosis:  312.89 (F91.8)  Other Specified Disruptive, Impulse-Control, and Conduct Disorder (Hypersexuality)  309.28 (F43.23) Adjustment Disorder, With mixed anxiety and depressed mood    Plan / Need for Future Services:  Return for therapy twice per month to address concerns related to compulsive sexual behaviors as well as anxious and depressive symptoms.       Janette \"Michael\" Walter, Ph.D.  Postdoctoral Fellow  "

## 2017-11-30 ENCOUNTER — OFFICE VISIT (OUTPATIENT)
Dept: OTHER | Facility: OUTPATIENT CENTER | Age: 32
End: 2017-11-30

## 2017-11-30 DIAGNOSIS — F43.23 ADJUSTMENT DISORDER WITH MIXED ANXIETY AND DEPRESSED MOOD: ICD-10-CM

## 2017-11-30 DIAGNOSIS — F91.8 CONDUCT DISORDER, UNDIFFERENTIATED TYPE: Primary | ICD-10-CM

## 2017-11-30 NOTE — PROGRESS NOTES
"[facility] -  Case Progress Note    Date of Service: 11/30/17  Client Name: Issac Henning  YOB: 1985  MRN:  [id number]  Treating Provider: Janette \"Michael\" Walter, Ph.D., Postdoctoral Fellow  Type of Session: Individual  Present in Session: Client and support person (wife)  Number of Minutes: 55    Health Maintenance Summary - Mental Health Treatment Plan       Status Date      Mental Health Tx Plan Q11 MOS Overdue 11/14/2017      Done 12/14/2016         Current Symptoms/Status:  Client reported presenting for treatment with concerns regarding compulsive sexual behavior, specifically regarding erotic photographing and using the internet to gain attention and solicit models for photoshoots. Client reported that his wife became aware of his behaviors during their wedding honeymoon, which has led to distress in their relationship.     Client endorsed some depressive and anxious symptoms within the last six months and particularly since his wife caught him. As such, a diagnosis of Adjustment Disorder, With mixed anxiety and depressed mood is warranted at this time.     Progress Toward Treatment Goals:  This was client's first session since July 2017    Intervention: Modality and Description/Response to Intervention:  Cognitive behavioral, interpersonal, and supportive psychotherapy techniques were used to assist client with processing his urges and ongoing distress. Client arrived for his scheduled appointment on time and invited his wife into the session. Discussed that his wife would like to learn more about how she can be supportive of client while he is going through the treatment process. Discussed ways to improve communication between them so they feel \"more on the same page\" and less like they both are reacting defensively towards each other. Client shared about having difficulty talking about his urges and acting out behaviors because of shame and embarrassment. He shared about what he was " "previously working on in therapy and that he was not able to schedule appointments for awhile because of insurance issues. Client's wife shared that she feels better having met this therapist as well as discussing their communication and ways she can support client.     Then, this therapist and client met alone. Client shared about boundary violations since last session. Shared about not having intercourse with his wife. He began to focus on his wife's behaviors. Encouraged him to focus on his own thoughts, feelings, and behaviors. Discussed client and his wife having trust issues for much of their relationship and client's desire to make changes including building trust with wife.     Assignment:  Work on boundaries    Interactive Complexity:  None    Diagnosis:  312.89 (F91.8)  Other Specified Disruptive, Impulse-Control, and Conduct Disorder (Hypersexuality)  309.28 (F43.23) Adjustment Disorder, With mixed anxiety and depressed mood    Plan / Need for Future Services:  Return for therapy twice per month.       Janette \"Michael\" Walter, Ph.D.  Postdoctoral Fellow  "

## 2017-12-15 NOTE — PROGRESS NOTES
I did not personally see the patient.  I reviewed and agree with the assessment and plan as documented in this note.     Herminia Lewis PsyD, LP

## 2018-01-03 ENCOUNTER — TELEPHONE (OUTPATIENT)
Dept: OTHER | Facility: OUTPATIENT CENTER | Age: 33
End: 2018-01-03

## 2018-01-03 NOTE — TELEPHONE ENCOUNTER
This therapist left a message for client inquiring about interest in scheduling individual therapy appointments since the last session he had was in November 2017. Requested client return call.    DANIEL Watson, PhD  Postdoctoral Fellow

## 2018-01-15 ENCOUNTER — OFFICE VISIT (OUTPATIENT)
Dept: OTHER | Facility: OUTPATIENT CENTER | Age: 33
End: 2018-01-15
Payer: COMMERCIAL

## 2018-01-15 DIAGNOSIS — F43.23 ADJUSTMENT DISORDER WITH MIXED ANXIETY AND DEPRESSED MOOD: ICD-10-CM

## 2018-01-15 DIAGNOSIS — F91.8 CONDUCT DISORDER, UNDIFFERENTIATED TYPE: Primary | ICD-10-CM

## 2018-01-15 ASSESSMENT — ANXIETY QUESTIONNAIRES
GAD7 TOTAL SCORE: 3
3. WORRYING TOO MUCH ABOUT DIFFERENT THINGS: SEVERAL DAYS
1. FEELING NERVOUS, ANXIOUS, OR ON EDGE: SEVERAL DAYS
5. BEING SO RESTLESS THAT IT IS HARD TO SIT STILL: NOT AT ALL
2. NOT BEING ABLE TO STOP OR CONTROL WORRYING: NOT AT ALL
6. BECOMING EASILY ANNOYED OR IRRITABLE: NOT AT ALL
7. FEELING AFRAID AS IF SOMETHING AWFUL MIGHT HAPPEN: NOT AT ALL

## 2018-01-15 ASSESSMENT — PATIENT HEALTH QUESTIONNAIRE - PHQ9: 5. POOR APPETITE OR OVEREATING: SEVERAL DAYS

## 2018-01-15 NOTE — MR AVS SNAPSHOT
After Visit Summary   1/15/2018    Issac Henning    MRN: [id number]           Patient Information     Date Of Birth          1985        Visit Information        Provider Department      1/15/2018 9:00 AM Janette Watson, PhD [facility]        Today's Diagnoses     Conduct disorder, undifferentiated type    -  1    Adjustment disorder with mixed anxiety and depressed mood           Follow-ups after your visit        Your next 10 appointments already scheduled     Feb 15, 2018  9:00 AM CST   INDIVIDUAL THERAPY with Janette Watson, PhD   [facility] ([facility])    [address]  [address] 7521  [facility] [address]   [phone]            Mar 21, 2018  9:00 AM CDT   INDIVIDUAL THERAPY with Janette Watson, PhD   [facility] ([facility])    [address]  [address] 75[address]  [facility] [address]   [phone]              Who to contact     Please call your clinic at [phone] to:    Ask questions about your health    Make or cancel appointments    Discuss your medicines    Learn about your test results    Speak to your doctor   If you have compliments or concerns about an experience at your clinic, or if you wish to file a complaint, please contact [facility] Patient Relations at [phone] or email us at [email or web address]         Additional Information About Your Visit        MyChart Information     Research & Innovation is an electronic gateway that provides easy, online access to your medical records. With Research & Innovation, you can request a clinic appointment, read your test results, renew a prescription or communicate with your care team.     To sign up for Polymita Technologiest visit the website at [email or web address]   You will be asked to enter the access code listed below, as well as some personal information. Please follow the directions to create your username and password.     Your  access code is: [email or web address]  Expires: 3/14/2018 11:36 PM     Your access code will  in 90 days. If you need help or a new code, please contact your [facility] or call [phone] for assistance.        Care EveryWhere ID     This is your Care EveryWhere ID. This could be used by other organizations to access your [place] medical records  [id number]         Blood Pressure from Last 3 Encounters:   No data found for BP    Weight from Last 3 Encounters:   No data found for Wt              We Performed the Following     Diagnostic Assessment (complete) [41718]     Mental Health Tx Plan Scan (HIM Scan)        Primary Care Provider    None Specified       No primary provider on file.        Equal Access to Services     RIANNA KENDALL : Alanna Ruby, jakob phillips, lexi paniagua, malou granados . So [facility] [phone].    ATENCIÓN: Si habla español, tiene a gusman disposición servicios gratuitos de asistencia lingüística. Llame al [phone].    We comply with applicable federal civil rights laws and [place] laws. We do not discriminate on the basis of race, color, national origin, age, disability, sex, sexual orientation, or gender identity.            Thank you!     Thank you for choosing [facility]  for your care. Our goal is always to provide you with excellent care. Hearing back from our patients is one way we can continue to improve our services. Please take a few minutes to complete the written survey that you may receive in the mail after your visit with us. Thank you!             Your Updated Medication List - Protect others around you: Learn how to safely use, store and throw away your medicines at www.disposemymeds.org.      Notice  As of 1/15/2018 11:59 PM    You have not been prescribed any medications.

## 2018-02-05 NOTE — PROGRESS NOTES
[facility]  [address] [place] [address] [address]  [place] [address]                                                                                Phone: [phone]                                                                                  Fax: [phone]                                                                    [email or web address]    ANNUAL UPDATE: Diagnostic Assessment Interview    Date of Service: 1/15/18  Client Name: Issac Henning  YOB: 1985  Age: 33 year old  MRN:  [id number]  Gender/Gender Identity: Cisgender Male  Treating Provider: DANIEL Watson, PhD, Postdoctoral Fellow  Program: [facility]  Type of Session: Assessment  Present in Session: Client Only  Number of Minutes:  55     Of note: Client and therapist also reviewed and updated client's treatment plan to reflect information from this updated diagnostic assessment.    Updated Presenting Problem and Goals:  Client stated that he is continuing to seek services at [facility] because  I m still working through addiction of bad habits, fantasies and desires that weren t healthy for me or my relationship with my wife.  Client shared that he continues experiences urges that distress him and that he is doing his  best to divert these urges before they manifest and cause damage in my relationship.  He shared that he is working on establishing and maintaining boundaries, understanding his pattern, and managing stressful situations in healthy ways.     Of note, client shared that he and his wife continue to participate in couple s therapy with Lilly Guallpa at [facility].     Updated Mental Health History:   Client stated that there are no updates to this section at this time.     Updated Substance Use:   Client denied any current or past alcohol and/or drug use problems including illegal or unprescribed substances. On the CAGE-AID, client responded to the following items:    Have  you ever felt you should cut down on your drinking or drug use?  No  Have people annoyed you by criticizing your drinking or drug use? No  Have you ever felt bad or guilty about your drinking or drug use? No  Have you ever had a drink or used drugs in the morning to steady your nerves or get rid of a hangover? No     Client s final CAGE-AID score was 0.     Updated Medical History:   Client reported that there are no updates to medical history.    Updated Family History and Current Significant Relationship/Partner:   Client shared that he and his wife got  in August 2016, so they have now been  over a year. He noted that he is continuing to work on communication and rebuilding of trust in his relationship with his wife.     Updated Educational History:  Client reported that there are no changes to educational history.    Updated Occupational History:  Client reported that there are no changes to occupational history.    Updated Legal Issues:  Client reported no history of legal concerns.  ________________________________________________________________  CONCLUSIONS    Updated Strengths and Liabilities:   Strengths: Client identified his strengths as the following:  hard working,   kind for the most part,   self-motivated,  and  creative.      Liabilities: Client reported that his growth areas include  being more honest and open ,  being more supportive of my wife,  stress management, and coping with anxiety and difficult emotions.    Updated Symptoms:  Please see scanned PHQ-9, RONALDO-7, and Safety Screen    Updated Mental Status:   Appearance:  Casually dressed  Behavior/relationship to examiner/demeanor:  Cooperative  Orientation: Oriented to person, place and time  Speech Rate:  Normal  Speech Spontaneity:  Normal  Mood:   good   Affect:  Appropriate/mood-congruent  Thought Process (Associations):  Logical and Linear  Thought Content:  denies suicidal ideation, intent or plan  Abnormal Perception:   None  Attention/Concentration:  Fair  Language:  Intact  Insight:  Fair  Judgment:  Fair    Interactive Complexity:  None    Updated DSM-5 Diagnoses:  312.89 (F91.8) Other Specified Disruptive, Impulse-Control, and Conduct Disorder (Hypersexuality)  309.28 (F43.23) Adjustment Disorder, With mixed anxiety and depressed mood    Conclusions/Recommendations/Initial Treatment Goals:   Client is a 33 year old  cisgender male who is continuing to seek services in order to address concerns related to compulsive sexual behavior, anxiety, and mood. Based on client's current report of symptoms, client continues to meet criteria for Other Specified Disruptive, Impulse-Control, and Conduct Disorder (hypersexuality) and Adjustment Disorder, with mixed anxiety and depressed mood. Client s reported symptoms continue to affect cause distress. As such, these historical diagnoses will be retained at this time.     Based on the client's reported symptoms and impact on functioning, the plan for the patient is:  1. Continue supportive individual therapy with a provider specialized in sexuality concerns to address sexually compulsive behavior, develop an individualized sense of healthy sexuality, and develop/implement more adaptive coping skills for anxiety and difficult emotions.   2. Continue to coordinate care as needed with outside therapist (Lilly Guallpa)  3. Consider participating in a structured, group therapy format to address sexually compulsive behaviors.    DANIEL Watson, Ph.D.  Postdoctoral Fellow

## 2018-02-15 ENCOUNTER — OFFICE VISIT (OUTPATIENT)
Dept: OTHER | Facility: OUTPATIENT CENTER | Age: 33
End: 2018-02-15
Payer: COMMERCIAL

## 2018-02-15 DIAGNOSIS — F43.23 ADJUSTMENT DISORDER WITH MIXED ANXIETY AND DEPRESSED MOOD: ICD-10-CM

## 2018-02-15 DIAGNOSIS — F91.8 CONDUCT DISORDER, UNDIFFERENTIATED TYPE: Primary | ICD-10-CM

## 2018-02-15 NOTE — MR AVS SNAPSHOT
After Visit Summary   2/15/2018    Issac Henning    MRN: [id number]           Patient Information     Date Of Birth          1985        Visit Information        Provider Department      2/15/2018 9:00 AM Janette Watson, PhD [facility]        Today's Diagnoses     Conduct disorder, undifferentiated type    -  1    Adjustment disorder with mixed anxiety and depressed mood           Follow-ups after your visit        Your next 10 appointments already scheduled     Mar 21, 2018  9:00 AM CDT   INDIVIDUAL THERAPY with Janette Watson, PhD   [facility]    [address]  Mail Code 7521  [facility] [address]   [phone]              Who to contact     Please call your clinic at [phone] to:    Ask questions about your health    Make or cancel appointments    Discuss your medicines    Learn about your test results    Speak to your doctor            Additional Information About Your Visit        MyChart Information     Synapsifyt is an electronic gateway that provides easy, online access to your medical records. With Zero9, you can request a clinic appointment, read your test results, renew a prescription or communicate with your care team.     To sign up for Synapsifyt visit the website at [email or web address]   You will be asked to enter the access code listed below, as well as some personal information. Please follow the directions to create your username and password.     Your access code is: [email or web address]  Expires: 3/14/2018 11:36 PM     Your access code will  in 90 days. If you need help or a new code, please contact your [facility] or call [phone] for assistance.        Care EveryWhere ID     This is your Care EveryWhere ID. This could be used by other organizations to access your [facility] medical records  [id number]         Blood Pressure from Last 3 Encounters:   No data found  for BP    Weight from Last 3 Encounters:   No data found for Wt              We Performed the Following     Individual Psychotherapy (38-52 min) [10032]     Psychotherapy Interactive Complexity [11091]        Primary Care Provider    None Specified       No primary provider on file.        Equal Access to Services     RIANNA KENDALL : Hadii aad ku hadtete Ruby, wayuniorda luqadaha, qaybta kaalmada adeuzair, malou zafardomenic dominguezalmita mills roberta rivera. So [facility] [phone].    ATENCIÓN: Si habla español, tiene a gusman disposición servicios gratuitos de asistencia lingüística. Llame al [phone].    We comply with applicable federal civil rights laws and [place] laws. We do not discriminate on the basis of race, color, national origin, age, disability, sex, sexual orientation, or gender identity.            Thank you!     Thank you for choosing [facility]  for your care. Our goal is always to provide you with excellent care. Hearing back from our patients is one way we can continue to improve our services. Please take a few minutes to complete the written survey that you may receive in the mail after your visit with us. Thank you!             Your Updated Medication List - Protect others around you: Learn how to safely use, store and throw away your medicines at www.disposemymeds.org.      Notice  As of 2/15/2018 11:59 PM    You have not been prescribed any medications.

## 2018-02-19 NOTE — PROGRESS NOTES
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, looking at profiles or models/porn actresses, risk situations, thoughts/urges to act out sexually, boundary violations, depressive symptoms, irritability, anxiety, distress in relationship, conflict and fighting    Progress Toward Treatment Goals:  Reviewed and discussed recommendations since completing diagnostic update; made progress with discussing difficulties with maintaining boundaries    Intervention: Modality and Description/Response to Intervention:  Cognitive behavioral, interpersonal, and supportive psychotherapy techniques were used to assist client with processing his urges and ongoing distress. Reviewed recommendations from updated diagnostic assessment, and client stated that he agrees with the recommendations. Discussed client s boundaries regarding looking up profiles and other personal information about women involved in porn or modeling. Processed thoughts and feelings regarding recent examples of boundary violations. Challenged client s assertion that viewing these pages are  accidental  or  just happen . 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 Explored and identified that client was getting defensive and becoming reactive in the session. Processed about defensiveness and reactiveness. Praised client for talking about being  "defensive and how it was affecting him in the session.     Assignment:  Work on boundaries    Interactive Complexity:  Communication difficulties present during the current psychiatric procedure included the need to manage maladaptive communication related to high anxiety, high reactivity, repeated questions, and disagreement that complicated delivery of care. Specifically, client became highly reactive and defensive during the session.     Diagnosis:  312.89 (F91.8)  Other Specified Disruptive, Impulse-Control, and Conduct Disorder (Hypersexuality)  309.28 (F43.23) Adjustment Disorder, With mixed anxiety and depressed mood    Plan / Need for Future Services:  Return for therapy twice per month.       Janette \"Michael\" Walter, Ph.D.  Postdoctoral Fellow  "

## 2018-03-02 ASSESSMENT — PATIENT HEALTH QUESTIONNAIRE - PHQ9: SUM OF ALL RESPONSES TO PHQ QUESTIONS 1-9: 4

## 2018-03-02 ASSESSMENT — ANXIETY QUESTIONNAIRES: GAD7 TOTAL SCORE: 3

## 2018-03-21 ENCOUNTER — OFFICE VISIT (OUTPATIENT)
Dept: OTHER | Facility: OUTPATIENT CENTER | Age: 33
End: 2018-03-21
Payer: COMMERCIAL

## 2018-03-21 DIAGNOSIS — F91.8 CONDUCT DISORDER, UNDIFFERENTIATED TYPE: Primary | ICD-10-CM

## 2018-03-21 DIAGNOSIS — F43.23 ADJUSTMENT DISORDER WITH MIXED ANXIETY AND DEPRESSED MOOD: ICD-10-CM

## 2018-03-21 NOTE — MR AVS SNAPSHOT
After Visit Summary   3/21/2018    Issac Henning    MRN: [id number]           Patient Information     Date Of Birth          1985        Visit Information        Provider Department      3/21/2018 9:00 AM Janette Watson, PhD [facility]        Today's Diagnoses     Conduct disorder, undifferentiated type    -  1    Adjustment disorder with mixed anxiety and depressed mood           Follow-ups after your visit        Your next 10 appointments already scheduled     2018  1:00 PM CDT   Individual Therapy 53+ minutes with Janette Watson, PhD   [facility] ([facility])    [address]  Mail Code 7521  [facility] [address]   [phone]            May 14, 2018  9:00 AM CDT   Individual Therapy 53+ minutes with Janette Watson, PhD   [facility] ([facility])    [address]  Mail Code 7521  [facility] [address]   [phone]              Who to contact     Please call your clinic at [phone] to:    Ask questions about your health    Make or cancel appointments    Discuss your medicines    Learn about your test results    Speak to your doctor            Additional Information About Your Visit        MyChart Information     AwoX is an electronic gateway that provides easy, online access to your medical records. With AwoX, you can request a clinic appointment, read your test results, renew a prescription or communicate with your care team.     To sign up for Risk Identt visit the website at [email or web address]   You will be asked to enter the access code listed below, as well as some personal information. Please follow the directions to create your username and password.     Your access code is: [email or web address]  Expires: 2018 10:49 PM     Your access code will  in 90 days. If you need help or a new code, please contact your [facility] or call [phone]  for assistance.        Care EveryWhere ID     This is your Care EveryWhere ID. This could be used by other organizations to access your [facility] medical records  [id number]         Blood Pressure from Last 3 Encounters:   No data found for BP    Weight from Last 3 Encounters:   No data found for Wt              We Performed the Following     Individual Psychotherapy (38-52 min) [73767]        Primary Care Provider    None Specified       No primary provider on file.        Equal Access to Services     [facility]: Hadii aad ku hadasho Sooscarali, waaxda luqadaha, qaybta kaalmada adeegyada, malou jim hayrayshawnn wesly larajeisonjackie granados . So [facility] [phone].    ATENCIÓN: Si habla español, tiene a gusman disposición servicios gratuitos de asistencia lingüística. Dilciaame al [phone].    We comply with applicable federal civil rights laws and [place] laws. We do not discriminate on the basis of race, color, national origin, age, disability, sex, sexual orientation, or gender identity.            Thank you!     Thank you for choosing [facility]  for your care. Our goal is always to provide you with excellent care. Hearing back from our patients is one way we can continue to improve our services. Please take a few minutes to complete the written survey that you may receive in the mail after your visit with us. Thank you!             Your Updated Medication List - Protect others around you: Learn how to safely use, store and throw away your medicines at www.disposemymeds.org.      Notice  As of 3/21/2018 11:59 PM    You have not been prescribed any medications.

## 2018-03-21 NOTE — PROGRESS NOTES
[facility] -  Case Progress Note    Date of Service: 3/21/18  Client Name: Issac Henning  YOB: 1985  MRN:  [id number]  Treating Provider: DANIEL Watson, Ph.D., Postdoctoral Fellow  Type of Session: Individual  Present in Session: Client Only  Number of Minutes: 45    Health Maintenance Summary - Mental Health Treatment Plan       Status Date      Mental Health Tx Plan Q11 MOS Next Due 12/15/2018      Done 1/15/2018      Done 12/14/2016         Current Symptoms/Status:  Pattern of engaging soliciting women to engage in photo shoots, looking at profiles or models/porn actresses, risk situations, thoughts/urges to act out sexually, boundary violations, depressive symptoms, irritability, anxiety, distress in relationship, conflict and fighting    Progress Toward Treatment Goals:  Reviewed and discussed recommendations since completing diagnostic update; made progress with discussing difficulties with maintaining boundaries    Intervention: Modality and Description/Response to Intervention:  Cognitive behavioral, interpersonal, and supportive psychotherapy techniques were used to assist client with processing his urges and ongoing distress. Client shared that he has had no boundary violations since the last session. Client shared about recent communication and conflict with wife. Discussed the importance of listening, asking questions, validating how she feels, and accepting her response. Client shared concerns about his wife's drinking and how this adds complications to her communication. Encouraged client to avoid having important conversations if one or both of them have been drinking. Client stated he would try. Then, therapist inquired about whether client and his wife are still participating in family sessions. Client shared that they are but that they not fully honest in session. Discussed the importance of being honest and authentic in individual and family sessions. Discussed what  "makes it difficult for him to be honest in his family sessions. Encouraged him to talk about this in family sessions. Client then asked about all of the therapy options at [facility] and therapist shared about the various services offered. Client shared that he would like to talk with his wife about all of the services at [facility] and having a discussion with her about whether individual, group for partners, and/or family sessions at [facility] might be \"the best approach for us.\" Encouraged client to contact this therapist with additional questions about services.     Assignment:  Work on boundaries    Interactive Complexity:  None    Diagnosis:  312.89 (F91.8)  Other Specified Disruptive, Impulse-Control, and Conduct Disorder (Hypersexuality)  309.28 (F43.23) Adjustment Disorder, With mixed anxiety and depressed mood    Plan / Need for Future Services:  Return for therapy twice per month.       DANIEL Watson, Ph.D.  Postdoctoral Fellow  "

## 2018-04-02 NOTE — PROGRESS NOTES
I did not personally see the patient. I reviewed and agree with the assessment and plan of this note.       Namrata Weinstein, PhD, LP  Licensed Psychologist

## 2018-04-16 ENCOUNTER — OFFICE VISIT (OUTPATIENT)
Dept: OTHER | Facility: OUTPATIENT CENTER | Age: 33
End: 2018-04-16
Payer: COMMERCIAL

## 2018-04-16 DIAGNOSIS — F91.8 CONDUCT DISORDER, UNDIFFERENTIATED TYPE: Primary | ICD-10-CM

## 2018-04-16 DIAGNOSIS — F43.23 ADJUSTMENT DISORDER WITH MIXED ANXIETY AND DEPRESSED MOOD: ICD-10-CM

## 2018-04-16 NOTE — MR AVS SNAPSHOT
After Visit Summary   2018    Issac Henning    MRN: [id number]           Patient Information     Date Of Birth          1985        Visit Information        Provider Department      2018 1:00 PM Janette Watson, PhD [facility]        Today's Diagnoses     Conduct disorder, undifferentiated type    -  1    Adjustment disorder with mixed anxiety and depressed mood           Follow-ups after your visit        Your next 10 appointments already scheduled     May 14, 2018  9:00 AM CDT   Individual Therapy 53+ minutes with Janette Watson, PhD   [facility] ([facility])    [address]  Mail [address] 7521  [facility] [address]   [phone]            2018  9:00 AM CDT   Individual Therapy 53+ minutes with Janette Watson, PhD   [facility] ([facility])    [address]  Mail [address] 75[address]  [facility] [address]   [phone]              Who to contact     Please call your clinic at [phone] to:    Ask questions about your health    Make or cancel appointments    Discuss your medicines    Learn about your test results    Speak to your doctor            Additional Information About Your Visit        MyChart Information     Neocis is an electronic gateway that provides easy, online access to your medical records. With Neocis, you can request a clinic appointment, read your test results, renew a prescription or communicate with your care team.     To sign up for Reissuedt visit the website at [email or web address]   You will be asked to enter the access code listed below, as well as some personal information. Please follow the directions to create your username and password.     Your access code is: [email or web address]  Expires: 2018 10:49 PM     Your access code will  in 90 days. If you need help or a new code, please contact your [facility] or call [phone]  for assistance.        Care EveryWhere ID     This is your Care EveryWhere ID. This could be used by other organizations to access your [facility] medical records  [id number]         Blood Pressure from Last 3 Encounters:   No data found for BP    Weight from Last 3 Encounters:   No data found for Wt              We Performed the Following     Individual Psychotherapy (38-52 min) [78163]        Primary Care Provider    None Specified       No primary provider on file.        Equal Access to Services     [facility]: Hadii aad ku hadasho Sooscarali, waaxda luqadaha, qaybta kaalmada adeegyada, malou jim hayrayshawnn wesly larajeisonjackie granados . So [facility] [phone].    ATENCIÓN: Si habla español, tiene a gusman disposición servicios gratuitos de asistencia lingüística. Dilciaame al [phone].    We comply with applicable federal civil rights laws and [place] laws. We do not discriminate on the basis of race, color, national origin, age, disability, sex, sexual orientation, or gender identity.            Thank you!     Thank you for choosing [facility]  for your care. Our goal is always to provide you with excellent care. Hearing back from our patients is one way we can continue to improve our services. Please take a few minutes to complete the written survey that you may receive in the mail after your visit with us. Thank you!             Your Updated Medication List - Protect others around you: Learn how to safely use, store and throw away your medicines at www.disposemymeds.org.      Notice  As of 4/16/2018 11:59 PM    You have not been prescribed any medications.

## 2018-04-16 NOTE — PROGRESS NOTES
[facility] -  Case Progress Note    Date of Service: 4/16/18  Client Name: Issac Henning  YOB: 1985  MRN:  [id number]  Treating Provider: DANIEL Watson, Ph.D., Postdoctoral Fellow  Type of Session: Individual  Present in Session: Client Only  Number of Minutes: 45    Health Maintenance Summary - Mental Health Treatment Plan       Status Date      Mental Health Tx Plan Q11 MOS Next Due 12/15/2018      Done 1/15/2018      Done 12/14/2016         Current Symptoms/Status:  Pattern of engaging soliciting women to engage in photo shoots, looking at profiles or models/porn actresses, risk situations, thoughts/urges to act out sexually, boundary violations, depressive symptoms, irritability, anxiety, distress in relationship, conflict and fighting    Progress Toward Treatment Goals:  Reviewed and discussed recommendations since completing diagnostic update; made progress with discussing difficulties with maintaining boundaries    Intervention: Modality and Description/Response to Intervention:  Cognitive behavioral, interpersonal, and supportive psychotherapy techniques were used to assist client with processing his urges and ongoing distress. Client shared that he maintained his boundaries since the last session. Shared about recent communication and conflict with wife. Processed about continued concerns about her drinking. Processed about how his wife would like to start a family and his concerns about them not being ready. Discussed his concerns and how he can talk with his wife about these concerns. Encouraged him to discuss these topics with the support of a therapist, such as in family sessions. Client expressed frustrations and feeling hopeless that their relationship is going to get better. He shared about having thoughts of  or  because he is not seeing any improvements. Validated his reported feelings. Discussed how hurt both client and his wife are and how they are  both reacting and being defensive towards each other. Client agreed. Discussed how thought logs can help him recognize his patterns and make important changes for himself and in his relationship. Discussed what client was willing to do to start making changes. Client stated that he would consider doing thought logs or journaling.       Assignment:  Work on boundaries  Journal or do thought logs    Interactive Complexity:  None    Diagnosis:  312.89 (F91.8)  Other Specified Disruptive, Impulse-Control, and Conduct Disorder (Hypersexuality)  309.28 (F43.23) Adjustment Disorder, With mixed anxiety and depressed mood    Plan / Need for Future Services:  Return for therapy twice per month.       DANIEL Watson, Ph.D.  Postdoctoral Fellow

## 2018-05-14 ENCOUNTER — OFFICE VISIT (OUTPATIENT)
Dept: OTHER | Facility: OUTPATIENT CENTER | Age: 33
End: 2018-05-14
Payer: COMMERCIAL

## 2018-05-14 DIAGNOSIS — F43.23 ADJUSTMENT DISORDER WITH MIXED ANXIETY AND DEPRESSED MOOD: ICD-10-CM

## 2018-05-14 DIAGNOSIS — F91.8 CONDUCT DISORDER, UNDIFFERENTIATED TYPE: Primary | ICD-10-CM

## 2018-05-14 NOTE — MR AVS SNAPSHOT
After Visit Summary   2018    Issac Henning    MRN: [id number]           Patient Information     Date Of Birth          1985        Visit Information        Provider Department      2018 9:00 AM Janette Watson, PhD [facility]        Today's Diagnoses     Conduct disorder, undifferentiated type    -  1    Adjustment disorder with mixed anxiety and depressed mood           Follow-ups after your visit        Your next 10 appointments already scheduled     2018  9:00 AM CDT   Individual Therapy 53+ minutes with Janette Watson, PhD   [facility] ([facility])    [address]  Mail [address]  [facility] [address]   [phone]              Who to contact     Please call your clinic at [phone] to:    Ask questions about your health    Make or cancel appointments    Discuss your medicines    Learn about your test results    Speak to your doctor            Additional Information About Your Visit        MyChart Information     NephroGenext is an electronic gateway that provides easy, online access to your medical records. With 1Ring, you can request a clinic appointment, read your test results, renew a prescription or communicate with your care team.     To sign up for NephroGenext visit the website at [email or web address]   You will be asked to enter the access code listed below, as well as some personal information. Please follow the directions to create your username and password.     Your access code is: [email or web address]  Expires: 2018 10:49 PM     Your access code will  in 90 days. If you need help or a new code, please contact your [facility] or call [phone] for assistance.        Care EveryWhere ID     This is your Care EveryWhere ID. This could be used by other organizations to access your [facility] medical records  [id number]         Blood Pressure from Last 3 Encounters:   No  data found for BP    Weight from Last 3 Encounters:   No data found for Wt              We Performed the Following     Individual Psychotherapy (38-52 min) [04194]        Primary Care Provider    None Specified       No primary provider on file.        Equal Access to Services     RIANNA KENDALL : Hadii aad ku hadcharleymarshall Flori, jakob phillips, lexi kasuad paniagua, malou zafardomenic dominguezalmita ricardojackie rivera. So [facility] [phone].    ATENCIÓN: Si habla español, tiene a gusman disposición servicios gratuitos de asistencia lingüística. Llame al [phone].    We comply with applicable federal civil rights laws and [place] laws. We do not discriminate on the basis of race, color, national origin, age, disability, sex, sexual orientation, or gender identity.            Thank you!     Thank you for choosing [facility]  for your care. Our goal is always to provide you with excellent care. Hearing back from our patients is one way we can continue to improve our services. Please take a few minutes to complete the written survey that you may receive in the mail after your visit with us. Thank you!             Your Updated Medication List - Protect others around you: Learn how to safely use, store and throw away your medicines at www.disposemymeds.org.      Notice  As of 5/14/2018 11:59 PM    You have not been prescribed any medications.

## 2018-06-14 ENCOUNTER — OFFICE VISIT (OUTPATIENT)
Dept: OTHER | Facility: OUTPATIENT CENTER | Age: 33
End: 2018-06-14
Payer: COMMERCIAL

## 2018-06-14 DIAGNOSIS — F91.8 CONDUCT DISORDER, UNDIFFERENTIATED TYPE: Primary | ICD-10-CM

## 2018-06-14 DIAGNOSIS — F43.23 ADJUSTMENT DISORDER WITH MIXED ANXIETY AND DEPRESSED MOOD: ICD-10-CM

## 2018-06-14 NOTE — MR AVS SNAPSHOT
After Visit Summary   2018    Issac Henning    MRN: [id number]           Patient Information     Date Of Birth          1985        Visit Information        Provider Department      2018 9:00 AM Janette Watson, PhD [facility]        Today's Diagnoses     Conduct disorder, undifferentiated type    -  1    Adjustment disorder with mixed anxiety and depressed mood           Follow-ups after your visit        Who to contact     Please call your clinic at [phone] to:    Ask questions about your health    Make or cancel appointments    Discuss your medicines    Learn about your test results    Speak to your doctor            Additional Information About Your Visit        MyChart Information     GoodBelly is an electronic gateway that provides easy, online access to your medical records. With GoodBelly, you can request a clinic appointment, read your test results, renew a prescription or communicate with your care team.     To sign up for My-[employer] visit the website at [email or web address]   You will be asked to enter the access code listed below, as well as some personal information. Please follow the directions to create your username and password.     Your access code is: [email or web address]  Expires: 2018 10:49 PM     Your access code will  in 90 days. If you need help or a new code, please contact your [facility] or call [phone] for assistance.        Care EveryWhere ID     This is your Care EveryWhere ID. This could be used by other organizations to access your [facility] [place] medical records  [id number]         Blood Pressure from Last 3 Encounters:   No data found for BP    Weight from Last 3 Encounters:   No data found for Wt              We Performed the Following     Individual Psychotherapy (38-52 min) [27057]        Primary Care Provider    None Specified       No primary provider on file.        Equal Access to Services      RIANNA KENDALL : Hadii aad hermelindo samuel Ruby, wayuniorda luqadaha, qaybta kaalmada weslybrittmike, malou larajeisonjackie rivera. So [facility] [phone].    ATENCIÓN: Si habla español, tiene a gusman disposición servicios gratuitos de asistencia lingüística. Llame al [phone].    We comply with applicable federal civil rights laws and [place] laws. We do not discriminate on the basis of race, color, national origin, age, disability, sex, sexual orientation, or gender identity.            Thank you!     Thank you for choosing [facility]  for your care. Our goal is always to provide you with excellent care. Hearing back from our patients is one way we can continue to improve our services. Please take a few minutes to complete the written survey that you may receive in the mail after your visit with us. Thank you!             Your Updated Medication List - Protect others around you: Learn how to safely use, store and throw away your medicines at www.disposemymeds.org.      Notice  As of 6/14/2018 11:59 PM    You have not been prescribed any medications.

## 2018-06-18 NOTE — PROGRESS NOTES
"[facility] -  Case Progress Note    Date of Service: 6/14/18  Client Name: Issac Henning  YOB: 1985  MRN:  [id number]  Treating Provider: DANIEL Watson, Ph.D., Postdoctoral Fellow  Type of Session: Individual  Present in Session: Client Only  Number of Minutes: 45    Health Maintenance Summary - Mental Health Treatment Plan       Status Date      Mental Health Tx Plan Q11 MOS Next Due 12/15/2018      Done 1/15/2018      Done 12/14/2016         Current Symptoms/Status:  Pattern of engaging soliciting women to engage in photo shoots, looking at profiles or models/porn actresses, risk situations, thoughts/urges to act out sexually, boundary violations, depressive symptoms, irritability, anxiety, distress in relationship, conflict and fighting    Progress Toward Treatment Goals:  Made progress with discussing conflict in marriage as well as thinking and behavioral patterns    Intervention: Modality and Description/Response to Intervention:  CBT, interpersonal, and supportive psychotherapy techniques were used to assist client with processing thinking and behavioral patterns and ongoing distress. Client shared about continued conflict with wife. Processed about client feeling like nothing is changing and that things will not improve. Validated difficulty with process. Explored and identified what client has tried and what he hasn't which has been recommended. Challenged client's avoidance. Discussed that he has been avoiding conversations with his wife because he does not \"want to rock the boat more\" and \"keep things on the surface\". Discussed how this is problematic for change. Shared about conflict and continued concerns about wife's drinking. Completed activity (cycle sheet) in session related to most recent argument to better understand client's thoughts, interpretations, feelings, and reactions in the situation. Discussed interpreting from a self-view and the importance of understanding " partner's perspective as well. Discussed about what appears to be a pattern for him, particularly with regard to avoidance. Discussed what client can do differently such as acknowledging and validating partner's feelings as well as discussing honestly and openly about thoughts, feelings, and reactions in both individual and family therapy. Therapist noted how client has made improvements with regard to talking about his emotions and thinking in individual sessions. Reiterated to client the importance of this same work in family sessions, and encouraged client be honest about relationship conflict in family session (by talking from his own perspective and feelings, not blaming wife or only talking about what wife does). Client responded by saying he might talk with his wife about coming to a conjoint or family session with this therapist. This therapist requested to be informed prior to the appointment if his wife is going to be attending a session in order to discuss details. Client agreed.     Assignment:  Work on cycle    Interactive Complexity:  None    Diagnosis:  312.89 (F91.8)  Other Specified Disruptive, Impulse-Control, and Conduct Disorder (Hypersexuality)  309.28 (F43.23) Adjustment Disorder, With mixed anxiety and depressed mood    Plan / Need for Future Services:  Return for therapy twice per month.       DANIEL Watson, Ph.D.  Postdoctoral Fellow

## 2018-11-08 NOTE — PROGRESS NOTES
[facility] -  Case Progress Note    Date of Service: 5/14/18  Client Name: Issac Henning  YOB: 1985  MRN:  [id number]  Treating Provider: DANIEL Watson, Ph.D., Postdoctoral Fellow  Type of Session: Individual  Present in Session: Client Only  Number of Minutes: 47    Health Maintenance Summary - Mental Health Treatment Plan       Status Date      Mental Health Tx Plan Q11 MOS Next Due 12/15/2018      Done 1/15/2018      Done 12/14/2016         Current Symptoms/Status:  Pattern of engaging soliciting women to engage in photo shoots, looking at profiles or models/porn actresses, risk situations, thoughts/urges to act out sexually, boundary violations, depressive symptoms, irritability, anxiety, distress in relationship, conflict and fighting    Progress Toward Treatment Goals:  Made progress with discussing conflict in marriage and difficulties related to fully participating in therapy    Intervention: Modality and Description/Response to Intervention:  Cognitive behavioral, interpersonal, and supportive psychotherapy techniques were used to assist client with processing his urges and ongoing distress. Client shared about continued conflict with wife. He shared about having conversations while/after she has been drinking and that she says means comments to him. He expressed feeling confused because she will apologize and say she didn t mean what she said in the morning. He noted feeling tired and unsure about the future of their marriage. Revisited conversation from previous session about not having conversations while one or both have been drinking. Discussed talking with wife about making boundaries regarding when to have conversations. Client shared that he would like to talk with her and be transparent. Encouraged him to include this in a conversation about boundaries regarding communication. Client then shared that he feels like they as a couple are not improving. Reiterated the  importance of client being honest in individual and couples therapy.  Client shared about how this is difficult because of embarrassment and shame. Validated how difficult these conversations are and highlighted the importance of honesty if they want help and want to change. Discussed what client needs to feel like he can be more open and honest.      Assignment:  Work on boundaries  Journal or do thought logs    Interactive Complexity:  None    Diagnosis:  312.89 (F91.8)  Other Specified Disruptive, Impulse-Control, and Conduct Disorder (Hypersexuality)  309.28 (F43.23) Adjustment Disorder, With mixed anxiety and depressed mood    Plan / Need for Future Services:  Return for therapy twice per month.       DANIEL Watson, Ph.D.  Postdoctoral Fellow   Graft Cartilage Fenestration Text: The cartilage was fenestrated with a 2mm punch biopsy to help facilitate graft survival and healing.

## 2020-07-28 DIAGNOSIS — Z31.41 ENCOUNTER FOR SPERM COUNT FOR FERTILITY TESTING: ICD-10-CM

## 2020-07-28 PROCEDURE — 89322 SEMEN ANAL STRICT CRITERIA: CPT

## 2020-07-29 LAB
ABNORMAL SPERM: 93 MORPHOLOGY
ABSTINENCE DAYS: 6 DAYS (ref 2–7)
AGGLUTINATION: NO YES/NO
ANALYSIS TEMP - CENTIGRADE: 24 CENTIGRADE
CELL FRAGMENTS: NORMAL %
COLLECTION METHOD: NORMAL
COLLECTION SITE: NORMAL
CONSENT TO RELEASE TO PARTNER: YES
HEAD DEFECT: 94
IMMATURE SPERM: NORMAL %
IMMOTILE: 41 %
LAB RECEIPT TIME: NORMAL
LIQUEFIED: YES YES/NO
MIDPIECE DEFECT: 26
NON-PROGRESSIVE MOTILITY: 3 %
NORMAL SPERM: 7 % NORMAL FORMS (ref 4–?)
PROGRESSIVE MOTILITY: 56 % (ref 32–?)
ROUND CELLS: 0 MILLION/ML (ref ?–2)
SPECIMEN CONCENTRATION: 170 MILLION/ML (ref 15–?)
SPECIMEN PH: 7.6 PH (ref 7.2–?)
SPECIMEN TYPE: NORMAL
SPECIMEN VOL UR: 3 ML (ref 1.5–?)
TAIL DEFECT: 9
TIME OF ANALYSIS: NORMAL
TOTAL NUMBER: 510 MILLION (ref 39–?)
TOTAL PROGRESSIVE MOTILE: 286 MILLION (ref 15.6–?)
VISCOUS: NO YES/NO
VITALITY: NORMAL % (ref 58–?)
WBC SPECIMEN: NORMAL %